# Patient Record
Sex: MALE | Race: WHITE | Employment: FULL TIME | URBAN - METROPOLITAN AREA
[De-identification: names, ages, dates, MRNs, and addresses within clinical notes are randomized per-mention and may not be internally consistent; named-entity substitution may affect disease eponyms.]

---

## 2017-09-08 ENCOUNTER — OFFICE VISIT (OUTPATIENT)
Dept: UROLOGY | Age: 52
End: 2017-09-08

## 2017-09-08 VITALS
WEIGHT: 190 LBS | SYSTOLIC BLOOD PRESSURE: 104 MMHG | HEART RATE: 64 BPM | BODY MASS INDEX: 27.2 KG/M2 | HEIGHT: 70 IN | DIASTOLIC BLOOD PRESSURE: 70 MMHG

## 2017-09-08 DIAGNOSIS — N40.1 BENIGN NON-NODULAR PROSTATIC HYPERPLASIA WITH LOWER URINARY TRACT SYMPTOMS: ICD-10-CM

## 2017-09-08 DIAGNOSIS — Z87.442 HISTORY OF KIDNEY STONES: Primary | ICD-10-CM

## 2017-09-08 LAB
BILIRUBIN, POC: NORMAL
BLOOD URINE, POC: NORMAL
CLARITY, POC: CLEAR
COLOR, POC: YELLOW
GLUCOSE URINE, POC: NORMAL
KETONES, POC: NORMAL
LEUKOCYTE EST, POC: NORMAL
NITRITE, POC: NORMAL
PH, POC: 7
POST VOID RESIDUAL (PVR): 32 ML
PROTEIN, POC: NORMAL
SPECIFIC GRAVITY, POC: 1.01
UROBILINOGEN, POC: 0.2

## 2017-09-08 PROCEDURE — 81003 URINALYSIS AUTO W/O SCOPE: CPT | Performed by: UROLOGY

## 2017-09-08 PROCEDURE — 51798 US URINE CAPACITY MEASURE: CPT | Performed by: UROLOGY

## 2017-09-08 PROCEDURE — 99214 OFFICE O/P EST MOD 30 MIN: CPT | Performed by: UROLOGY

## 2017-09-08 RX ORDER — POTASSIUM CITRATE 10 MEQ/1
10 TABLET, EXTENDED RELEASE ORAL DAILY
Qty: 90 TABLET | Refills: 3 | Status: SHIPPED | OUTPATIENT
Start: 2017-09-08 | End: 2019-10-25

## 2017-09-08 RX ORDER — SILDENAFIL CITRATE 20 MG/1
TABLET ORAL
Qty: 30 TABLET | Refills: 11 | Status: SHIPPED | OUTPATIENT
Start: 2017-09-08

## 2017-09-08 RX ORDER — CHLORTHALIDONE 25 MG/1
TABLET ORAL
COMMUNITY
Start: 2017-06-30

## 2017-09-08 RX ORDER — TAMSULOSIN HYDROCHLORIDE 0.4 MG/1
0.4 CAPSULE ORAL DAILY
Qty: 30 CAPSULE | Refills: 3 | Status: SHIPPED | OUTPATIENT
Start: 2017-09-08

## 2018-09-06 ENCOUNTER — TELEPHONE (OUTPATIENT)
Dept: UROLOGY | Age: 53
End: 2018-09-06

## 2018-09-06 DIAGNOSIS — N40.1 BPH WITH OBSTRUCTION/LOWER URINARY TRACT SYMPTOMS: Primary | ICD-10-CM

## 2018-09-06 DIAGNOSIS — N13.8 BPH WITH OBSTRUCTION/LOWER URINARY TRACT SYMPTOMS: Primary | ICD-10-CM

## 2018-09-06 NOTE — TELEPHONE ENCOUNTER
----- Message from Thang Lee sent at 9/6/2018 10:45 AM EDT -----  Contact: [phone]  Pt rescheduled appt to 09/27/18 and needs new PSA order

## 2018-09-21 DIAGNOSIS — N40.1 BPH WITH OBSTRUCTION/LOWER URINARY TRACT SYMPTOMS: ICD-10-CM

## 2018-09-21 DIAGNOSIS — N13.8 BPH WITH OBSTRUCTION/LOWER URINARY TRACT SYMPTOMS: ICD-10-CM

## 2018-09-21 LAB — PROSTATE SPECIFIC ANTIGEN: 0.99 NG/ML (ref 0–3.89)

## 2018-10-02 ENCOUNTER — OFFICE VISIT (OUTPATIENT)
Dept: UROLOGY | Age: 53
End: 2018-10-02
Payer: COMMERCIAL

## 2018-10-02 VITALS
HEART RATE: 70 BPM | WEIGHT: 193 LBS | HEIGHT: 71 IN | DIASTOLIC BLOOD PRESSURE: 72 MMHG | SYSTOLIC BLOOD PRESSURE: 112 MMHG | BODY MASS INDEX: 27.02 KG/M2

## 2018-10-02 DIAGNOSIS — N40.1 BENIGN PROSTATIC HYPERPLASIA WITH LOWER URINARY TRACT SYMPTOMS, SYMPTOM DETAILS UNSPECIFIED: Primary | ICD-10-CM

## 2018-10-02 PROCEDURE — 99214 OFFICE O/P EST MOD 30 MIN: CPT | Performed by: UROLOGY

## 2018-10-02 RX ORDER — POTASSIUM CITRATE 10 MEQ/1
10 TABLET, EXTENDED RELEASE ORAL
Qty: 270 TABLET | Refills: 3 | Status: SHIPPED | OUTPATIENT
Start: 2018-10-02 | End: 2019-10-25 | Stop reason: SDUPTHER

## 2018-10-02 RX ORDER — FLUTICASONE PROPIONATE 44 UG/1
AEROSOL, METERED RESPIRATORY (INHALATION)
COMMUNITY
Start: 2017-11-06

## 2018-10-02 NOTE — PROGRESS NOTES
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 q. Day  Obstructive voiding symptoms well controlled with tamsulosin 0.4 mg p.o. q. Day  Erectile dysfunction well-controlled with sildenafil 20 mg p.o. P.r.n. Patient has had these issues for over 3 years  No further renal colic  Denies hematuria      Urologic Review of Systems/Symptoms  Denies hematuria  Denies dysuria  Denies incontinence  Denies flank pain  Other Urologic: No evidence of renal colic or hematuria    Review of Systems  Head and neck: No issues/reviewed  Cardiac: No recent issues/reviewed  Pulmonary: No issues/reviewed  Gastrointestinal: No issues/reviewed  Neurologic: No recent issues/reviewed  Extremities: No issues/reviewed  Lymphatics: No lymphadenopathy no change  Genitourinary: See above  Skin: No issues/reviewed  Hospitalization: None recent  Medications reviewed  All 14 categories of Review of Systems otherwise reviewed no other findings reported. Past Medical History:   Diagnosis Date    Asthma      Past Surgical History:   Procedure Laterality Date    THYROID SURGERY       Social History     Social History    Marital status:      Spouse name: N/A    Number of children: N/A    Years of education: N/A     Social History Main Topics    Smoking status: Never Smoker    Smokeless tobacco: Never Used    Alcohol use Yes    Drug use: No    Sexual activity: Yes     Partners: Female     Other Topics Concern    None     Social History Narrative    None     History reviewed. No pertinent family history.   Current Outpatient Prescriptions   Medication nerves intact   Skin: Skin is warm and dry. No lesions. No rashes or ulcers   Psychiatric:  Normal affect. Assessment/Medical Necessity-Decision Making  BPH with obstruction symptoms well controlled with tamsulosin  History of uric acid calculi well-controlled with Urocit-K  History of erectile dysfunction responsive to sildenafil  Plan  Prescriptions written  Follow-up one year with PSA  Greater than 50% of 25 minutes spent consulting patient face-to-face  Orders Placed This Encounter   Procedures    PSA, Diagnostic     Standing Status:   Future     Standing Expiration Date:   10/2/2019    [facility] X-RAY ABDOMEN 1 VW     Orders Placed This Encounter   Medications    potassium citrate (UROCIT-K) 10 MEQ (1080 MG) extended release tablet     Sig: Take 1 tablet by mouth 3 times daily (with meals)     Dispense:  270 tablet     Refill:  3     Michelle Sanabria MD       Please note this report has been partially produced using speech recognition software  And may cause contain errors related to that system including grammar, punctuation and spelling as well as words and phrases that may seem inappropriate. If there are questions or concerns please feel free to contact me to clarify.

## 2019-01-17 ENCOUNTER — HOSPITAL ENCOUNTER (EMERGENCY)
Age: 54
Discharge: HOME | End: 2019-01-17
Payer: COMMERCIAL

## 2019-01-17 ENCOUNTER — APPOINTMENT (OUTPATIENT)
Dept: CT IMAGING | Age: 54
End: 2019-01-17
Payer: COMMERCIAL

## 2019-01-17 VITALS
WEIGHT: 196 LBS | BODY MASS INDEX: 28.06 KG/M2 | DIASTOLIC BLOOD PRESSURE: 84 MMHG | HEART RATE: 76 BPM | HEIGHT: 70 IN | TEMPERATURE: 97.3 F | SYSTOLIC BLOOD PRESSURE: 150 MMHG | RESPIRATION RATE: 18 BRPM | OXYGEN SATURATION: 97 %

## 2019-01-17 DIAGNOSIS — N30.01 ACUTE CYSTITIS WITH HEMATURIA: Primary | ICD-10-CM

## 2019-01-17 DIAGNOSIS — J01.90 ACUTE RHINOSINUSITIS: ICD-10-CM

## 2019-01-17 DIAGNOSIS — N20.0 KIDNEY STONE: ICD-10-CM

## 2019-01-17 LAB
ALBUMIN SERPL-MCNC: 4.2 G/DL (ref 3.9–4.9)
ALP BLD-CCNC: 82 U/L (ref 35–104)
ALT SERPL-CCNC: 25 U/L (ref 0–41)
ANION GAP SERPL CALCULATED.3IONS-SCNC: 12 MEQ/L (ref 7–13)
AST SERPL-CCNC: 23 U/L (ref 0–40)
BACTERIA: ABNORMAL /HPF
BASOPHILS ABSOLUTE: 0.1 K/UL (ref 0–0.2)
BASOPHILS RELATIVE PERCENT: 0.5 %
BILIRUB SERPL-MCNC: 1 MG/DL (ref 0–1.2)
BILIRUBIN URINE: NEGATIVE
BLOOD, URINE: ABNORMAL
BUN BLDV-MCNC: 10 MG/DL (ref 6–20)
CALCIUM SERPL-MCNC: 9.7 MG/DL (ref 8.6–10.2)
CHLORIDE BLD-SCNC: 96 MEQ/L (ref 98–107)
CLARITY: CLEAR
CO2: 32 MEQ/L (ref 22–29)
COLOR: YELLOW
CREAT SERPL-MCNC: 0.75 MG/DL (ref 0.7–1.2)
EOSINOPHILS ABSOLUTE: 0.3 K/UL (ref 0–0.7)
EOSINOPHILS RELATIVE PERCENT: 2.9 %
EPITHELIAL CELLS, UA: ABNORMAL /HPF
GFR AFRICAN AMERICAN: >60
GFR NON-AFRICAN AMERICAN: >60
GLOBULIN: 3.5 G/DL (ref 2.3–3.5)
GLUCOSE BLD-MCNC: 119 MG/DL (ref 74–109)
GLUCOSE URINE: NEGATIVE MG/DL
HCT VFR BLD CALC: 46.8 % (ref 42–52)
HEMOGLOBIN: 16.8 G/DL (ref 14–18)
KETONES, URINE: NEGATIVE MG/DL
LEUKOCYTE ESTERASE, URINE: ABNORMAL
LYMPHOCYTES ABSOLUTE: 1.1 K/UL (ref 1–4.8)
LYMPHOCYTES RELATIVE PERCENT: 11.9 %
MCH RBC QN AUTO: 31.2 PG (ref 27–31.3)
MCHC RBC AUTO-ENTMCNC: 35.8 % (ref 33–37)
MCV RBC AUTO: 87.1 FL (ref 80–100)
MONOCYTES ABSOLUTE: 1.2 K/UL (ref 0.2–0.8)
MONOCYTES RELATIVE PERCENT: 12.8 %
NEUTROPHILS ABSOLUTE: 6.9 K/UL (ref 1.4–6.5)
NEUTROPHILS RELATIVE PERCENT: 71.9 %
NITRITE, URINE: NEGATIVE
PDW BLD-RTO: 12.9 % (ref 11.5–14.5)
PH UA: 6.5 (ref 5–9)
PLATELET # BLD: 257 K/UL (ref 130–400)
POTASSIUM SERPL-SCNC: 3.5 MEQ/L (ref 3.5–5.1)
PROTEIN UA: NEGATIVE MG/DL
RAPID INFLUENZA  B AGN: NEGATIVE
RAPID INFLUENZA A AGN: NEGATIVE
RBC # BLD: 5.38 M/UL (ref 4.7–6.1)
RBC UA: ABNORMAL /HPF (ref 0–2)
SODIUM BLD-SCNC: 140 MEQ/L (ref 132–144)
SPECIFIC GRAVITY UA: 1.01 (ref 1–1.03)
TOTAL PROTEIN: 7.7 G/DL (ref 6.4–8.1)
URINE REFLEX TO CULTURE: YES
UROBILINOGEN, URINE: 0.2 E.U./DL
WBC # BLD: 9.6 K/UL (ref 4.8–10.8)
WBC UA: ABNORMAL /HPF (ref 0–5)

## 2019-01-17 PROCEDURE — 87804 INFLUENZA ASSAY W/OPTIC: CPT

## 2019-01-17 PROCEDURE — 85025 COMPLETE CBC W/AUTO DIFF WBC: CPT

## 2019-01-17 PROCEDURE — 81001 URINALYSIS AUTO W/SCOPE: CPT

## 2019-01-17 PROCEDURE — 80053 COMPREHEN METABOLIC PANEL: CPT

## 2019-01-17 PROCEDURE — 36415 COLL VENOUS BLD VENIPUNCTURE: CPT

## 2019-01-17 PROCEDURE — 87186 SC STD MICRODIL/AGAR DIL: CPT

## 2019-01-17 PROCEDURE — 2580000003 HC RX 258: Performed by: NURSE PRACTITIONER

## 2019-01-17 PROCEDURE — 87086 URINE CULTURE/COLONY COUNT: CPT

## 2019-01-17 PROCEDURE — 87077 CULTURE AEROBIC IDENTIFY: CPT

## 2019-01-17 PROCEDURE — 74176 CT ABD & PELVIS W/O CONTRAST: CPT

## 2019-01-17 PROCEDURE — 99283 EMERGENCY DEPT VISIT LOW MDM: CPT

## 2019-01-17 RX ORDER — 0.9 % SODIUM CHLORIDE 0.9 %
1000 INTRAVENOUS SOLUTION INTRAVENOUS ONCE
Status: COMPLETED | OUTPATIENT
Start: 2019-01-17 | End: 2019-01-17

## 2019-01-17 RX ORDER — BROMPHENIRAMINE MALEATE, PSEUDOEPHEDRINE HYDROCHLORIDE, AND DEXTROMETHORPHAN HYDROBROMIDE 2; 30; 10 MG/5ML; MG/5ML; MG/5ML
10 SYRUP ORAL EVERY 6 HOURS PRN
Qty: 200 ML | Refills: 0 | Status: SHIPPED | OUTPATIENT
Start: 2019-01-17 | End: 2022-11-03

## 2019-01-17 RX ORDER — PHENAZOPYRIDINE HYDROCHLORIDE 200 MG/1
200 TABLET, FILM COATED ORAL 3 TIMES DAILY PRN
Qty: 9 TABLET | Refills: 0 | Status: SHIPPED | OUTPATIENT
Start: 2019-01-17 | End: 2021-11-02 | Stop reason: ALTCHOICE

## 2019-01-17 RX ORDER — SULFAMETHOXAZOLE AND TRIMETHOPRIM 800; 160 MG/1; MG/1
1 TABLET ORAL 2 TIMES DAILY
Qty: 20 TABLET | Refills: 0 | Status: SHIPPED | OUTPATIENT
Start: 2019-01-17 | End: 2019-01-27

## 2019-01-17 RX ADMIN — SODIUM CHLORIDE 1000 ML: 9 INJECTION, SOLUTION INTRAVENOUS at 14:39

## 2019-01-17 ASSESSMENT — ENCOUNTER SYMPTOMS
RHINORRHEA: 1
EYE ITCHING: 0
ABDOMINAL DISTENTION: 0
BLOOD IN STOOL: 0
COLOR CHANGE: 0
COUGH: 0
NAUSEA: 0
DIARRHEA: 0
TROUBLE SWALLOWING: 0
BACK PAIN: 0
EYE PAIN: 0
VOMITING: 0
CONSTIPATION: 0
SORE THROAT: 0
SINUS PRESSURE: 1
ABDOMINAL PAIN: 1
WHEEZING: 0
SHORTNESS OF BREATH: 0
EYE REDNESS: 0
VOICE CHANGE: 0

## 2019-01-18 ENCOUNTER — OFFICE VISIT (OUTPATIENT)
Dept: UROLOGY | Age: 54
End: 2019-01-18
Payer: COMMERCIAL

## 2019-01-18 VITALS
HEIGHT: 70 IN | BODY MASS INDEX: 28.06 KG/M2 | DIASTOLIC BLOOD PRESSURE: 86 MMHG | HEART RATE: 76 BPM | WEIGHT: 196 LBS | SYSTOLIC BLOOD PRESSURE: 124 MMHG

## 2019-01-18 DIAGNOSIS — R33.9 URINARY RETENTION: Primary | ICD-10-CM

## 2019-01-18 LAB — POST VOID RESIDUAL (PVR): 20 ML

## 2019-01-18 PROCEDURE — 99214 OFFICE O/P EST MOD 30 MIN: CPT | Performed by: UROLOGY

## 2019-01-18 PROCEDURE — 51798 US URINE CAPACITY MEASURE: CPT | Performed by: UROLOGY

## 2019-01-19 LAB
ORGANISM: ABNORMAL
URINE CULTURE, ROUTINE: ABNORMAL
URINE CULTURE, ROUTINE: ABNORMAL

## 2019-09-30 ENCOUNTER — TELEPHONE (OUTPATIENT)
Dept: UROLOGY | Age: 54
End: 2019-09-30

## 2019-09-30 DIAGNOSIS — N40.0 BPH WITHOUT OBSTRUCTION/LOWER URINARY TRACT SYMPTOMS: Primary | ICD-10-CM

## 2019-10-18 DIAGNOSIS — N40.0 BPH WITHOUT OBSTRUCTION/LOWER URINARY TRACT SYMPTOMS: ICD-10-CM

## 2019-10-18 LAB — PROSTATE SPECIFIC ANTIGEN: 0.85 NG/ML (ref 0–3.89)

## 2019-10-25 ENCOUNTER — OFFICE VISIT (OUTPATIENT)
Dept: UROLOGY | Age: 54
End: 2019-10-25
Payer: COMMERCIAL

## 2019-10-25 VITALS
BODY MASS INDEX: 27.44 KG/M2 | HEIGHT: 71 IN | HEART RATE: 68 BPM | WEIGHT: 196 LBS | SYSTOLIC BLOOD PRESSURE: 104 MMHG | DIASTOLIC BLOOD PRESSURE: 72 MMHG

## 2019-10-25 DIAGNOSIS — N40.0 BPH WITHOUT OBSTRUCTION/LOWER URINARY TRACT SYMPTOMS: Primary | ICD-10-CM

## 2019-10-25 PROCEDURE — 99213 OFFICE O/P EST LOW 20 MIN: CPT | Performed by: UROLOGY

## 2019-10-25 RX ORDER — POTASSIUM CITRATE 10 MEQ/1
10 TABLET, EXTENDED RELEASE ORAL
Qty: 270 TABLET | Refills: 3 | Status: SHIPPED | OUTPATIENT
Start: 2019-10-25 | End: 2021-08-27 | Stop reason: SDUPTHER

## 2020-10-28 DIAGNOSIS — N40.0 BPH WITHOUT OBSTRUCTION/LOWER URINARY TRACT SYMPTOMS: ICD-10-CM

## 2020-10-28 LAB — PROSTATE SPECIFIC ANTIGEN: 0.7 NG/ML (ref 0–3.89)

## 2020-10-30 ENCOUNTER — OFFICE VISIT (OUTPATIENT)
Dept: UROLOGY | Age: 55
End: 2020-10-30
Payer: COMMERCIAL

## 2020-10-30 VITALS
DIASTOLIC BLOOD PRESSURE: 84 MMHG | BODY MASS INDEX: 26.41 KG/M2 | HEART RATE: 62 BPM | HEIGHT: 72 IN | WEIGHT: 195 LBS | SYSTOLIC BLOOD PRESSURE: 112 MMHG

## 2020-10-30 PROCEDURE — 99213 OFFICE O/P EST LOW 20 MIN: CPT | Performed by: UROLOGY

## 2020-10-30 RX ORDER — ASPIRIN 81 MG/1
81 TABLET, CHEWABLE ORAL DAILY
COMMUNITY

## 2020-10-30 RX ORDER — MULTIVIT-MIN/IRON/FOLIC ACID/K 18-600-40
CAPSULE ORAL
COMMUNITY

## 2020-10-30 NOTE — PROGRESS NOTES
[facility] EVALUATION NOTE                                                 H&P                                                                                                                                                 Reason for Visit  BPH with obstruction, erectile dysfunction, recurrent uric acid calculi    History of Present Illness  64-year-old air Car Arreola  History of recurrent renal colic secondary to uric acid calculi well controlled over many years with systemic alkalinization  BPH with mild obstructive voiding symptoms well controlled with tamsulosin  Erectile dysfunction well-controlled with sildenafil  Patient denies recurrent calculi type pain or renal colic type pain  Denies hematuria        Urologic Review of Systems/Symptoms  No issues over the past year    Review of Systems  Hospitalization: No hospitalizations  All 14 categories of Review of Systems otherwise reviewed no other findings reported. Past Medical History:   Diagnosis Date    Asthma     Hypertension     Kidney stone     Thyroid disease      Past Surgical History:   Procedure Laterality Date    THYROID SURGERY       Social History     Socioeconomic History    Marital status:      Spouse name: None    Number of children: None    Years of education: None    Highest education level: None   Occupational History    None   Social Needs    Financial resource strain: None    Food insecurity     Worry: None     Inability: None    Transportation needs     Medical: None     Non-medical: None   Tobacco Use    Smoking status: Never Smoker    Smokeless tobacco: Never Used   Substance and Sexual Activity    Alcohol use:  Yes    Drug use: No    Sexual activity: Yes     Partners: Female   Lifestyle    Physical activity     Days per week: None     Minutes per session: None    Stress: None   Relationships    Social connections     Talks on phone: None     Gets together: None     Attends Adventism service: None Active member of club or organization: None     Attends meetings of clubs or organizations: None     Relationship status: None    Intimate partner violence     Fear of current or ex partner: None     Emotionally abused: None     Physically abused: None     Forced sexual activity: None   Other Topics Concern    None   Social History Narrative    None     History reviewed. No pertinent family history. Current Outpatient Medications   Medication Sig Dispense Refill    Cholecalciferol (VITAMIN D) 50 MCG (2000 UT) CAPS capsule Take by mouth      potassium citrate (UROCIT-K) 10 MEQ (1080 MG) extended release tablet Take 1 tablet by mouth 3 times daily (with meals) 270 tablet 3    fluticasone (FLOVENT HFA) 44 MCG/ACT inhaler INHALE 1 (ONE) puff BY MOUTH AS DIRECTED TWICE DAILY      chlorthalidone (HYGROTON) 25 MG tablet       tamsulosin (FLOMAX) 0.4 MG capsule Take 1 capsule by mouth daily 30 capsule 3    sildenafil (REVATIO) 20 MG tablet Take one tablet to five tablets (maximum of five in one day) AS NEEDED on an empty stomach two hours before planned sexual activity. 30 tablet 11    albuterol (PROVENTIL HFA;VENTOLIN HFA) 108 (90 BASE) MCG/ACT inhaler Inhale 2 puffs into the lungs every 6 hours as needed for Wheezing.  Levothyroxine Sodium (SYNTHROID PO) Take 0.25 mg by mouth.  aspirin 81 MG chewable tablet Take 81 mg by mouth daily      phenazopyridine (PYRIDIUM) 200 MG tablet Take 1 tablet by mouth 3 times daily as needed for Pain (Patient not taking: Reported on 10/25/2019) 9 tablet 0    brompheniramine-pseudoephedrine-DM (BROMFED DM) 30-2-10 MG/5ML syrup Take 10 mLs by mouth every 6 hours as needed for Congestion or Cough (Patient not taking: Reported on 10/25/2019) 200 mL 0     No current facility-administered medications for this visit. Patient has no known allergies.   All reviewed and verified by Dr Jose R Almanza on today's visit    PSA   Date Value Ref Range Status   10/28/2020 0.70 0.00 - 3.89 ng/mL Final   10/18/2019 0.85 0.00 - 3.89 ng/mL Final   09/21/2018 0.99 0.00 - 3.89 ng/mL Final     No results found for this visit on 10/30/20. Physical Exam  Vitals:    10/30/20 1321   BP: 112/84   Pulse: 62   Weight: 195 lb (88.5 kg)   Height: 6' (1.829 m)     Constitutional: Not in distress. Head and Neck: No lymphadenopathy  Cardiovascular: Normal rate, BP reviewed. Normal  Pulmonary/Chest: Normal respiratory effort not short of breath  Abdominal: Not distended. Urologic Exam  Urologic exam unchanged. Normal rectal tone. Small prostate  PSA stable at 0.70 lower than last year. Musculoskeletal: Ambulatory. Extremities: No edema  Neurological: No deficits  Skin: No rashes  Psychiatric: Normal affect. Assessment/Medical Necessity-Decision Making  BPH with obstruction well-controlled with tamsulosin  History of recurrent uric acid calculi no renal colic or the past year well controlled with Urocit-K  Erectile dysfunction treated well with sildenafil  Plan  PSA 1 year with follow-up  Greater than 50% of 15 minutes spent consulting patient face-to-face  Orders Placed This Encounter   Procedures    PSA, Diagnostic     Standing Status:   Future     Standing Expiration Date:   10/30/2021     No orders of the defined types were placed in this encounter. Aga Mancuso MD       Please note this report has been partially produced using speech recognition software  And may cause contain errors related to that system including grammar, punctuation and spelling as well as words and phrases that may seem inappropriate. If there are questions or concerns please feel free to contact me to clarify.

## 2021-08-28 RX ORDER — POTASSIUM CITRATE 10 MEQ/1
10 TABLET, EXTENDED RELEASE ORAL
Qty: 270 TABLET | Refills: 3 | Status: SHIPPED | OUTPATIENT
Start: 2021-08-28 | End: 2021-11-02 | Stop reason: SDUPTHER

## 2021-10-30 ENCOUNTER — HOSPITAL ENCOUNTER (OUTPATIENT)
Dept: LAB | Age: 56
Discharge: HOME | End: 2021-10-30
Payer: COMMERCIAL

## 2021-10-30 DIAGNOSIS — N40.0 BPH WITHOUT OBSTRUCTION/LOWER URINARY TRACT SYMPTOMS: ICD-10-CM

## 2021-10-30 LAB — PROSTATE SPECIFIC ANTIGEN: 0.79 NG/ML (ref 0–4)

## 2021-10-30 PROCEDURE — 36415 COLL VENOUS BLD VENIPUNCTURE: CPT

## 2021-10-30 PROCEDURE — 84153 ASSAY OF PSA TOTAL: CPT

## 2021-11-02 ENCOUNTER — OFFICE VISIT (OUTPATIENT)
Dept: UROLOGY | Age: 56
End: 2021-11-02
Payer: COMMERCIAL

## 2021-11-02 VITALS
SYSTOLIC BLOOD PRESSURE: 104 MMHG | HEART RATE: 56 BPM | HEIGHT: 70 IN | DIASTOLIC BLOOD PRESSURE: 74 MMHG | BODY MASS INDEX: 27.2 KG/M2 | WEIGHT: 190 LBS

## 2021-11-02 DIAGNOSIS — N40.0 BPH WITHOUT OBSTRUCTION/LOWER URINARY TRACT SYMPTOMS: Primary | ICD-10-CM

## 2021-11-02 PROCEDURE — 99213 OFFICE O/P EST LOW 20 MIN: CPT | Performed by: UROLOGY

## 2021-11-02 RX ORDER — POTASSIUM CITRATE 10 MEQ/1
10 TABLET, EXTENDED RELEASE ORAL
Qty: 270 TABLET | Refills: 3 | Status: SHIPPED | OUTPATIENT
Start: 2021-11-02

## 2021-11-02 NOTE — PROGRESS NOTES
[facility] EVALUATION NOTE                                                 H&P                                                                                                                                                 Reason for Visit  BPH without obstruction, uric acid calculi (recurrent) (    History of Present Illness  55-year-old male with history of recurrent uric acid calculi  Patient takes Urocit-K 3 times a day for alkalinization therapy  Has not had any kidney stones in over 4 years  Patient's PSA remains low under 1.0  Minimal obstructive voiding symptoms  Denies renal colic      Urologic Review of Systems/Symptoms  Unchanged    Review of Systems  Hospitalization: None recent  All 14 categories of Review of Systems otherwise reviewed no other findings reported. No change in review of systems still working as an [facility] [address]  Past Medical History:   Diagnosis Date    Asthma     Hypertension     Kidney stone     Thyroid disease      Past Surgical History:   Procedure Laterality Date    THYROID SURGERY       Social History     Socioeconomic History    Marital status:      Spouse name: None    Number of children: None    Years of education: None    Highest education level: None   Occupational History    None   Tobacco Use    Smoking status: Never Smoker    Smokeless tobacco: Never Used   Substance and Sexual Activity    Alcohol use: Yes    Drug use: No    Sexual activity: Yes     Partners: Female   Other Topics Concern    None   Social History Narrative    None     Social Determinants of Health     Financial Resource Strain:     Difficulty of Paying Living Expenses:    Food Insecurity:     Worried About Running Out of Food in the Last Year:     [address] in the Last Year:    Transportation Needs:     Lack of Transportation (Medical):      Lack of Transportation (Non-Medical):    Physical Activity:     Days of Exercise per Week:     Minutes of Exercise per Session:    Stress:     Feeling of Stress :    Social Connections:     Frequency of Communication with Friends and Family:     Frequency of Social Gatherings with Friends and Family:     Attends Restoration Services:     Active Member of Clubs or Organizations:     Attends Club or Organization Meetings:     Marital Status:    Intimate Partner Violence:     Fear of Current or Ex-Partner:     Emotionally Abused:     Physically Abused:     Sexually Abused:      History reviewed. No pertinent family history. Current Outpatient Medications   Medication Sig Dispense Refill    potassium citrate (UROCIT-K) 10 MEQ (1080 MG) extended release tablet Take 1 tablet by mouth 3 times daily (with meals) 270 tablet 3    aspirin 81 MG chewable tablet Take 81 mg by mouth daily      Cholecalciferol (VITAMIN D) 50 MCG (2000 UT) CAPS capsule Take by mouth      brompheniramine-pseudoephedrine-DM (BROMFED DM) 30-2-10 MG/5ML syrup Take 10 mLs by mouth every 6 hours as needed for Congestion or Cough 200 mL 0    fluticasone (FLOVENT HFA) 44 MCG/ACT inhaler INHALE 1 (ONE) puff BY MOUTH AS DIRECTED TWICE DAILY      chlorthalidone (HYGROTON) 25 MG tablet       sildenafil (REVATIO) 20 MG tablet Take one tablet to five tablets (maximum of five in one day) AS NEEDED on an empty stomach two hours before planned sexual activity. 30 tablet 11    albuterol (PROVENTIL HFA;VENTOLIN HFA) 108 (90 BASE) MCG/ACT inhaler Inhale 2 puffs into the lungs every 6 hours as needed for Wheezing.  Levothyroxine Sodium (SYNTHROID PO) Take 0.25 mg by mouth.  tamsulosin (FLOMAX) 0.4 MG capsule Take 1 capsule by mouth daily (Patient not taking: Reported on 11/2/2021) 30 capsule 3     No current facility-administered medications for this visit. Patient has no known allergies.   All reviewed and verified by Dr Yogesh Zhu on today's visit    PSA   Date Value Ref Range Status   10/30/2021 0.79 0.00 - 4.00 ng/mL Final   10/28/2020 0.70 0.00 - 3.89 ng/mL Final   10/18/2019 0.85 0.00 - 3.89 ng/mL Final   09/21/2018 0.99 0.00 - 3.89 ng/mL Final     No results found for this visit on 11/02/21. Physical Exam  Vitals:    11/02/21 1315   BP: 104/74   Pulse: 56   Weight: 190 lb (86.2 kg)   Height: 5' 10\" (1.778 m)     Constitutional: Not in distress. Cardiovascular: Normal rate, BP reviewed. Normal  Pulmonary/Chest: Normal respiratory effort not short of breath  Abdominal: Not distended. Unchanged  Urologic Exam  Urologic exam stable  Normal rectal tone  20 g prostate with no nodules  PSA 0.79. Assessment/Medical Necessity-Decision Making  Recurrent uric acid calculi no recent renal colic responding well to alkalinization therapy  Plan  Urocit-K written  PSA 1 year follow-up  Greater than 50% of 20 minutes spent consulting patient face-to-face  Orders Placed This Encounter   Procedures    PSA, Diagnostic     Standing Status:   Future     Standing Expiration Date:   11/2/2022     Orders Placed This Encounter   Medications    potassium citrate (UROCIT-K) 10 MEQ (1080 MG) extended release tablet     Sig: Take 1 tablet by mouth 3 times daily (with meals)     Dispense:  270 tablet     Refill:  3     Jude Singer MD       Please note this report has been partially produced using speech recognition software  And may cause contain errors related to that system including grammar, punctuation and spelling as well as words and phrases that may seem inappropriate. If there are questions or concerns please feel free to contact me to clarify.

## 2022-10-31 DIAGNOSIS — N40.0 BPH WITHOUT OBSTRUCTION/LOWER URINARY TRACT SYMPTOMS: ICD-10-CM

## 2022-11-01 LAB — PROSTATE SPECIFIC ANTIGEN: 0.72 NG/ML (ref 0–4)

## 2022-11-03 ENCOUNTER — OFFICE VISIT (OUTPATIENT)
Dept: UROLOGY | Age: 57
End: 2022-11-03
Payer: COMMERCIAL

## 2022-11-03 VITALS
WEIGHT: 200 LBS | OXYGEN SATURATION: 96 % | HEIGHT: 70 IN | SYSTOLIC BLOOD PRESSURE: 110 MMHG | HEART RATE: 60 BPM | BODY MASS INDEX: 28.63 KG/M2 | DIASTOLIC BLOOD PRESSURE: 70 MMHG

## 2022-11-03 DIAGNOSIS — N40.0 BPH WITHOUT OBSTRUCTION/LOWER URINARY TRACT SYMPTOMS: Primary | ICD-10-CM

## 2022-11-03 PROCEDURE — 99212 OFFICE O/P EST SF 10 MIN: CPT | Performed by: UROLOGY

## 2022-11-03 RX ORDER — LEVOTHYROXINE SODIUM 137 UG/1
137 TABLET ORAL DAILY
COMMUNITY

## 2022-11-03 RX ORDER — ROSUVASTATIN CALCIUM 10 MG/1
TABLET, COATED ORAL
COMMUNITY
Start: 2022-09-14

## 2022-11-03 NOTE — PROGRESS NOTES
[facility] EVALUATION NOTE                                                 H&P          Note:  Assessment and plan  BPH with obstruction well controlled with tamsulosin  History of nephrolithiasis well-controlled with Urocit-K  Denies any renal colic over the past year  Continue tamsulosin  Recent PSA is lower at 0.76  PSA 1 year with follow-up      The note below is complete evaluation of patient on follow-up/consultation                                                                                                                                                 Reason for Visit  BPH with obstruction/nephrocalcinosis    History of Present Illness  60-year-old male with history of renal calculi  No recent renal colic patient taking Urocit-K  BPH with obstruction symptoms well controlled with tamsulosin  Recent PSA 0.76      Urologic Review of Systems/Symptoms  Unchanged    Review of Systems  Hospitalization: None recent  All 14 categories of Review of Systems otherwise reviewed no other findings reported. No change in review of systems  Past Medical History:   Diagnosis Date    Asthma     Hypertension     Kidney stone     Thyroid disease      Past Surgical History:   Procedure Laterality Date    THYROID SURGERY       Social History     Socioeconomic History    Marital status:      Spouse name: None    Number of children: None    Years of education: None    Highest education level: None   Tobacco Use    Smoking status: Never    Smokeless tobacco: Never   Substance and Sexual Activity    Alcohol use: Yes    Drug use: No    Sexual activity: Yes     Partners: Female     History reviewed. No pertinent family history. Current Outpatient Medications   Medication Sig Dispense Refill    rosuvastatin (CRESTOR) 10 MG tablet Take 1 tablet by mouth daily at bedtime.       levothyroxine (SYNTHROID) 137 MCG tablet Take 137 mcg by mouth Daily      potassium citrate (UROCIT-K) 10 MEQ (1080 MG) extended release tablet Take 1 tablet by mouth 3 times daily (with meals) 270 tablet 3    aspirin 81 MG chewable tablet Take 81 mg by mouth daily      Cholecalciferol (VITAMIN D) 50 MCG (2000 UT) CAPS capsule Take by mouth      fluticasone (FLOVENT HFA) 44 MCG/ACT inhaler INHALE 1 (ONE) puff BY MOUTH AS DIRECTED TWICE DAILY      chlorthalidone (HYGROTON) 25 MG tablet       tamsulosin (FLOMAX) 0.4 MG capsule Take 1 capsule by mouth daily 30 capsule 3    sildenafil (REVATIO) 20 MG tablet Take one tablet to five tablets (maximum of five in one day) AS NEEDED on an empty stomach two hours before planned sexual activity. 30 tablet 11    albuterol (PROVENTIL HFA;VENTOLIN HFA) 108 (90 BASE) MCG/ACT inhaler Inhale 2 puffs into the lungs every 6 hours as needed for Wheezing. No current facility-administered medications for this visit. Patient has no known allergies. All reviewed and verified by Dr David Licona on today's visit    PSA   Date Value Ref Range Status   10/31/2022 0.72 0.00 - 4.00 ng/mL Final   10/30/2021 0.79 0.00 - 4.00 ng/mL Final   10/28/2020 0.70 0.00 - 3.89 ng/mL Final   10/18/2019 0.85 0.00 - 3.89 ng/mL Final   09/21/2018 0.99 0.00 - 3.89 ng/mL Final     No results found for this visit on 11/03/22. Physical Exam  Vitals:    11/03/22 1304 11/03/22 1305   BP:  110/70   Pulse:  60   SpO2:  96%   Weight: 200 lb (90.7 kg)    Height: 5' 10\" (1.778 m)      Constitutional: Not in distress.   Urologic Exam  Prostate exam not indicated  Additional findings  None  Remainder the physical exam is normal  Assessment/Medical Necessity-Decision Making  History of nephrolithiasis no recent renal colic well-controlled with Urocit-K  BPH with obstruction symptoms well controlled with tamsulosin  PSA stable at 0.72  Plan  PSA 1 year with follow-up  Greater than 50% of 15 minutes spent consulting patient face-to-face  Orders Placed This Encounter   Procedures    PSA, Diagnostic     Standing Status:   Future     Standing Expiration Date:   11/3/2023     No orders of the defined types were placed in this encounter. Jacquie Mortensen MD       Please note this report has been partially produced using speech recognition software  And may cause contain errors related to that system including grammar, punctuation and spelling as well as words and phrases that may seem inappropriate. If there are questions or concerns please feel free to contact me to clarify.

## 2023-04-03 RX ORDER — POTASSIUM CITRATE 10 MEQ/1
10 TABLET, EXTENDED RELEASE ORAL
Qty: 270 TABLET | Refills: 3 | Status: SHIPPED | OUTPATIENT
Start: 2023-04-03

## 2023-04-03 NOTE — TELEPHONE ENCOUNTER
From: Jose Manuel Woods  To:  Office of Dr. Demetria Quiroz: 4/1/2023 8:22 PM EDT  Subject: Medication Renewal Request    Refills have been requested for the following medications:     potassium citrate (UROCIT-K) 10 MEQ (1080 MG) extended release tablet [Dr. Gene Strange MD]   Patient Comment: 90 day supply please    Preferred pharmacy: [id number]  [place] [id number] [facility]

## 2024-01-16 ENCOUNTER — APPOINTMENT (OUTPATIENT)
Dept: GENERAL RADIOLOGY | Age: 59
End: 2024-01-16
Payer: COMMERCIAL

## 2024-01-16 ENCOUNTER — HOSPITAL ENCOUNTER (EMERGENCY)
Age: 59
Discharge: HOME | End: 2024-01-16
Payer: COMMERCIAL

## 2024-01-16 VITALS
SYSTOLIC BLOOD PRESSURE: 120 MMHG | DIASTOLIC BLOOD PRESSURE: 85 MMHG | OXYGEN SATURATION: 95 % | HEART RATE: 75 BPM | WEIGHT: 205 LBS | BODY MASS INDEX: 29.35 KG/M2 | HEIGHT: 70 IN | RESPIRATION RATE: 16 BRPM | TEMPERATURE: 98.2 F

## 2024-01-16 DIAGNOSIS — S61.411A LACERATION OF RIGHT HAND WITHOUT FOREIGN BODY, INITIAL ENCOUNTER: Primary | ICD-10-CM

## 2024-01-16 PROCEDURE — 99283 EMERGENCY DEPT VISIT LOW MDM: CPT

## 2024-01-16 PROCEDURE — 6370000000 HC RX 637 (ALT 250 FOR IP)

## 2024-01-16 PROCEDURE — 2500000003 HC RX 250 WO HCPCS

## 2024-01-16 PROCEDURE — 12001 RPR S/N/AX/GEN/TRNK 2.5CM/<: CPT

## 2024-01-16 PROCEDURE — 73130 X-RAY EXAM OF HAND: CPT

## 2024-01-16 RX ORDER — GINSENG 100 MG
CAPSULE ORAL ONCE
Status: COMPLETED | OUTPATIENT
Start: 2024-01-16 | End: 2024-01-16

## 2024-01-16 RX ORDER — LIDOCAINE HYDROCHLORIDE 10 MG/ML
5 INJECTION, SOLUTION INFILTRATION; PERINEURAL ONCE
Status: COMPLETED | OUTPATIENT
Start: 2024-01-16 | End: 2024-01-16

## 2024-01-16 RX ADMIN — LIDOCAINE HYDROCHLORIDE 5 ML: 10 INJECTION, SOLUTION INFILTRATION; PERINEURAL at 16:38

## 2024-01-16 RX ADMIN — Medication 3 ML: at 16:38

## 2024-01-16 RX ADMIN — BACITRACIN 1 EACH: 500 OINTMENT TOPICAL at 16:38

## 2024-01-16 ASSESSMENT — PAIN DESCRIPTION - LOCATION: LOCATION: HAND

## 2024-01-16 ASSESSMENT — PAIN DESCRIPTION - ORIENTATION: ORIENTATION: RIGHT

## 2024-01-16 ASSESSMENT — PAIN DESCRIPTION - DESCRIPTORS: DESCRIPTORS: SORE;BURNING

## 2024-01-16 ASSESSMENT — PAIN - FUNCTIONAL ASSESSMENT: PAIN_FUNCTIONAL_ASSESSMENT: 0-10

## 2024-01-16 ASSESSMENT — ENCOUNTER SYMPTOMS: COLOR CHANGE: 0

## 2024-01-16 ASSESSMENT — PAIN SCALES - GENERAL: PAINLEVEL_OUTOF10: 3

## 2024-01-16 NOTE — DISCHARGE INSTRUCTIONS
Please apply bacitracin ointment daily to suture site.  Change bandage daily.  Follow-up with PCP for suture removal in 1 week.

## 2024-01-16 NOTE — ED PROVIDER NOTES
with patient. Patient will follow up with pcp for suture removal in one week.  Discussed diagnosis, treatment, follow-up, reasons to return to emergency department patient and spouse state understanding, patient discharged home in stable condition.    PROCEDURES:  Laceration Repair Procedure Note    Indication: Laceration    Procedure: The patient was placed in the appropriate position and anesthesia around the laceration was obtained by infiltration using 1% Lidocaine without epinephrine. The area was then cleansed with Shur-Clens and draped in a sterile fashion and irrigated with Shur-Clens and normal saline. The laceration was closed with 5-0 Prolene using interrupted sutures. There were no additional lacerations requiring repair. The wound area was then dressed with bacitracin, a sterile dressing, and a bandage.      Total repaired wound length: 2 cm.     Other Items: Suture count: 2    The patient tolerated the procedure well.    Complications: None   Procedures      FINAL IMPRESSION      1. Laceration of right hand without foreign body, initial encounter          DISPOSITION/PLAN   DISPOSITION Decision To Discharge 01/16/2024 05:31:11 PM      PATIENT REFERRED TO:  Joce Silva MD  [address] [facility] [address] [phone]    In 1 week  For suture removal    [facility] ED  [address] [facility] [address] [phone]    If symptoms worsen      DISCHARGE MEDICATIONS:  New Prescriptions    No medications on file       (Please note that portions of this note were completed with a voice recognition program.  Efforts were made to edit the dictations but occasionally words are mis-transcribed.)    LEXIS العراقي CNP, Kaci L, APRN - CNP  01/16/24 [id number]